# Patient Record
Sex: MALE | Race: WHITE | Employment: FULL TIME | URBAN - METROPOLITAN AREA
[De-identification: names, ages, dates, MRNs, and addresses within clinical notes are randomized per-mention and may not be internally consistent; named-entity substitution may affect disease eponyms.]

---

## 2019-01-11 ENCOUNTER — TELEPHONE (OUTPATIENT)
Dept: PALLIATIVE MEDICINE | Facility: CLINIC | Age: 34
End: 2019-01-11

## 2019-01-11 ENCOUNTER — OFFICE VISIT (OUTPATIENT)
Dept: INTERNAL MEDICINE | Facility: CLINIC | Age: 34
End: 2019-01-11
Payer: COMMERCIAL

## 2019-01-11 VITALS
HEIGHT: 71 IN | WEIGHT: 234.8 LBS | OXYGEN SATURATION: 98 % | BODY MASS INDEX: 32.87 KG/M2 | SYSTOLIC BLOOD PRESSURE: 112 MMHG | HEART RATE: 98 BPM | TEMPERATURE: 98.1 F | DIASTOLIC BLOOD PRESSURE: 68 MMHG

## 2019-01-11 DIAGNOSIS — G89.29 OTHER CHRONIC PAIN: ICD-10-CM

## 2019-01-11 DIAGNOSIS — Z13.1 SCREENING FOR DIABETES MELLITUS: ICD-10-CM

## 2019-01-11 DIAGNOSIS — Z11.4 SCREENING FOR HIV (HUMAN IMMUNODEFICIENCY VIRUS): ICD-10-CM

## 2019-01-11 DIAGNOSIS — Z00.00 ROUTINE HISTORY AND PHYSICAL EXAMINATION OF ADULT: Primary | ICD-10-CM

## 2019-01-11 DIAGNOSIS — Z23 NEED FOR PROPHYLACTIC VACCINATION AND INOCULATION AGAINST INFLUENZA: ICD-10-CM

## 2019-01-11 DIAGNOSIS — Z13.220 LIPID SCREENING: ICD-10-CM

## 2019-01-11 PROCEDURE — 99385 PREV VISIT NEW AGE 18-39: CPT | Mod: 25 | Performed by: PHYSICIAN ASSISTANT

## 2019-01-11 PROCEDURE — 90471 IMMUNIZATION ADMIN: CPT | Performed by: PHYSICIAN ASSISTANT

## 2019-01-11 PROCEDURE — 99213 OFFICE O/P EST LOW 20 MIN: CPT | Mod: 25 | Performed by: PHYSICIAN ASSISTANT

## 2019-01-11 PROCEDURE — 90686 IIV4 VACC NO PRSV 0.5 ML IM: CPT | Performed by: PHYSICIAN ASSISTANT

## 2019-01-11 RX ORDER — SULINDAC 150 MG/1
150 TABLET ORAL 2 TIMES DAILY
Qty: 60 TABLET | Refills: 3 | Status: SHIPPED | OUTPATIENT
Start: 2019-01-11 | End: 2020-01-11

## 2019-01-11 RX ORDER — OXYCODONE HYDROCHLORIDE 5 MG/1
5 TABLET ORAL EVERY 6 HOURS PRN
Qty: 5 TABLET | Refills: 0 | Status: SHIPPED | OUTPATIENT
Start: 2019-01-11 | End: 2019-01-14

## 2019-01-11 SDOH — HEALTH STABILITY: MENTAL HEALTH: HOW OFTEN DO YOU HAVE A DRINK CONTAINING ALCOHOL?: 2-4 TIMES A MONTH

## 2019-01-11 SDOH — HEALTH STABILITY: MENTAL HEALTH: HOW MANY STANDARD DRINKS CONTAINING ALCOHOL DO YOU HAVE ON A TYPICAL DAY?: 1 OR 2

## 2019-01-11 ASSESSMENT — MIFFLIN-ST. JEOR: SCORE: 2037.73

## 2019-01-11 NOTE — PROGRESS NOTES
SUBJECTIVE:   CC: Cuong Reyes is an 33 year old male who presents for preventative health visit.     Physical   Annual:     Getting at least 3 servings of Calcium per day:  Yes    Bi-annual eye exam:  Yes    Dental care twice a year:  Yes    Sleep apnea or symptoms of sleep apnea:  None    Diet:  Regular (no restrictions)    Frequency of exercise:  1 day/week    Duration of exercise:  15-30 minutes    Taking medications regularly:  Not Applicable    Additional concerns today:  No    PHQ-2 Total Score: 0      Chronic hand and shoulder pain:  Pt is wondering about getting back on pain medication. Patient reports a history of damaged ulnar nerve during shoulder surgery then second surgery to re-correct it. Since his surgeries he has had chronic pain. 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 He took sulindac daily. Pt reports he has not needed pain medication in the past year. He is starting work as a  next week and is worried he will need pain medication.     Today's PHQ-2 Score:   PHQ-2 (  [employer]) 2019   Q1: Little interest or pleasure in doing things 0   Q2: Feeling down, depressed or hopeless 0   PHQ-2 Score 0       Abuse: Current or Past(Physical, Sexual or Emotional)- No  Do you feel safe in your environment? Yes    Social History     Tobacco Use     Smoking status: Former Smoker     Last attempt to quit: 2014     Years since quittin.9     Smokeless tobacco: Never Used   Substance Use Topics     Alcohol use: Yes     Frequency: 2-4 times a month     Drinks per session: 1 or 2     Comment: every other every 2 week     No flowsheet data found.    Last PSA: No results found for: PSA    Reviewed orders with patient. Reviewed health maintenance and updated orders accordingly - Yes    Reviewed and updated as needed this visit by clinical staff  Tobacco  Allergies  Meds  Surg Hx         Reviewed and updated as needed this visit by  "Provider  Surg Hx          Review of Systems  CONSTITUTIONAL: NEGATIVE for fever, chills, change in weight  INTEGUMENTARY/SKIN: NEGATIVE for worrisome rashes, moles or lesions  EYES: NEGATIVE for vision changes or irritation  ENT: NEGATIVE for ear, mouth and throat problems  RESP: NEGATIVE for significant cough or SOB  CV: NEGATIVE for chest pain, palpitations or peripheral edema  GI: NEGATIVE for nausea, abdominal pain, heartburn, or change in bowel habits   male: negative for dysuria, hematuria, decreased urinary stream, erectile dysfunction, urethral discharge  MUSCULOSKELETAL: NEGATIVE for significant arthralgias or myalgia  Positive: pain in upper part of back   NEURO: NEGATIVE for weakness, dizziness or paresthesias  PSYCHIATRIC: NEGATIVE for changes in mood or affect    OBJECTIVE:   /68   Pulse 98   Temp 98.1  F (36.7  C) (Oral)   Ht 1.812 m (5' 11.35\")   Wt 106.5 kg (234 lb 12.8 oz)   SpO2 98%   BMI 32.43 kg/m      Physical Exam  GENERAL: healthy, alert and no distress  EYES: Eyes grossly normal to inspection, PERRL and conjunctivae and sclerae normal  HENT: ear canals and TM's normal, nose and mouth without ulcers or lesions  NECK: no adenopathy, no asymmetry, masses, or scars and thyroid normal to palpation  RESP: lungs clear to auscultation - no rales, rhonchi or wheezes  CV: regular rate and rhythm, normal S1 S2, no S3 or S4, no murmur, click or rub, no peripheral edema and peripheral pulses strong  ABDOMEN: soft, nontender, no hepatosplenomegaly, no masses and bowel sounds normal  MS: no gross musculoskeletal defects noted, no edema (noted shoulder and hand tenderness)   SKIN: no suspicious lesions or rashes  NEURO: Normal strength and tone, mentation intact and speech normal  PSYCH: mentation appears normal, affect normal/bright      ASSESSMENT/PLAN:     1. Routine history and physical examination of adult  - reviewed PMH and health maintenance     2. Screening for HIV (human " immunodeficiency virus)  - pt declined     3. Need for prophylactic vaccination and inoculation against influenza  - FLU VACCINE, SPLIT VIRUS, IM (QUADRIVALENT) [05770]- >3 YRS  - Vaccine Administration, Initial [81639]    4. Screening for diabetes mellitus  - Glucose; Future    5. Lipid screening  - Lipid panel reflex to direct LDL Fasting; Future    6. Other chronic pain  - referral for pain clinic for further review, short term supply in the meantime  -  reviewed with no red flags   - PAIN MANAGEMENT REFERRAL  - oxyCODONE (ROXICODONE) 5 MG tablet; Take 1 tablet (5 mg) by mouth every 6 hours as needed for pain  Dispense: 5 tablet; Refill: 0  - sulindac (CLINORIL) 150 MG tablet; Take 1 tablet (150 mg) by mouth 2 times daily  Dispense: 60 tablet; Refill: 3  - CBC with platelets; Future  - Comprehensive metabolic panel; Future    Lety Katz PA-C  [facility]    Injectable Influenza Immunization Documentation    1.  Is the person to be vaccinated sick today?   No    2. Does the person to be vaccinated have an allergy to a component   of the vaccine?   No  Egg Allergy Algorithm Link    3. Has the person to be vaccinated ever had a serious reaction   to influenza vaccine in the past?   No    4. Has the person to be vaccinated ever had Guillain-Barré syndrome?   No    Form completed by Milvia Hoffman

## 2019-01-11 NOTE — TELEPHONE ENCOUNTER
Pain Management Center Referral      1. Confirmed address with patient? Yes  2. Confirmed phone number with patient? Yes  3. Confirmed referring provider? Yes  4. Is the PCP the same as the referring provider? Yes  5. Has the patient been to any previous pain clinics? No  (If yes, send [facility] with welcome letter)  6. Which insurance are we to bill for this appointment? [employer]    7. Informed pt of cancellation (48 hour) policy? Yes    REGARDING OPIOID MEDICATIONS: We will always address appropriateness of opioid pain medications, but we generally will not automatically take on a prescribing role. When we do take on prescribing of opioids for chronic pain, it is in collaboration with the referring physician for an intermediate period of time (months), with an expectation that the primary physician or provider will assume the prescribing role if medications are effective at stable doses with demonstrated compliance. Therefore, please do not assume that your prescribing responsibilities end on the day of pain clinic consultation.  8. Informed pt of prescribing policy? Yes    9.Please be aware that once you are established with a pain provider and location, you will need to continue have all future visits with that provider and location. It is best to determine what location is the most convenient for you and schedule with that one.    ** PATIENT INFORMED OF THIS POLICY Yes      9. Referring Provider: No Ref-Primary, Physician

## 2019-01-15 DIAGNOSIS — G89.29 OTHER CHRONIC PAIN: ICD-10-CM

## 2019-01-15 DIAGNOSIS — Z13.1 SCREENING FOR DIABETES MELLITUS: ICD-10-CM

## 2019-01-15 DIAGNOSIS — Z13.220 LIPID SCREENING: ICD-10-CM

## 2019-01-15 LAB
ALBUMIN SERPL-MCNC: 4.3 G/DL (ref 3.4–5)
ALP SERPL-CCNC: 54 U/L (ref 40–150)
ALT SERPL W P-5'-P-CCNC: 33 U/L (ref 0–70)
ANION GAP SERPL CALCULATED.3IONS-SCNC: 4 MMOL/L (ref 3–14)
AST SERPL W P-5'-P-CCNC: 15 U/L (ref 0–45)
BILIRUB SERPL-MCNC: 0.6 MG/DL (ref 0.2–1.3)
BUN SERPL-MCNC: 16 MG/DL (ref 7–30)
CALCIUM SERPL-MCNC: 9.1 MG/DL (ref 8.5–10.1)
CHLORIDE SERPL-SCNC: 107 MMOL/L (ref 94–109)
CHOLEST SERPL-MCNC: 130 MG/DL
CO2 SERPL-SCNC: 28 MMOL/L (ref 20–32)
CREAT SERPL-MCNC: 0.83 MG/DL (ref 0.66–1.25)
ERYTHROCYTE [DISTWIDTH] IN BLOOD BY AUTOMATED COUNT: 12.7 % (ref 10–15)
GFR SERPL CREATININE-BSD FRML MDRD: >90 ML/MIN/{1.73_M2}
GLUCOSE SERPL-MCNC: 90 MG/DL (ref 70–99)
HCT VFR BLD AUTO: 42.4 % (ref 40–53)
HDLC SERPL-MCNC: 40 MG/DL
HGB BLD-MCNC: 14.3 G/DL (ref 13.3–17.7)
LDLC SERPL CALC-MCNC: 72 MG/DL
MCH RBC QN AUTO: 29.1 PG (ref 26.5–33)
MCHC RBC AUTO-ENTMCNC: 33.7 G/DL (ref 31.5–36.5)
MCV RBC AUTO: 86 FL (ref 78–100)
NONHDLC SERPL-MCNC: 90 MG/DL
PLATELET # BLD AUTO: 163 10E9/L (ref 150–450)
POTASSIUM SERPL-SCNC: 4.3 MMOL/L (ref 3.4–5.3)
PROT SERPL-MCNC: 7.1 G/DL (ref 6.8–8.8)
RBC # BLD AUTO: 4.92 10E12/L (ref 4.4–5.9)
SODIUM SERPL-SCNC: 139 MMOL/L (ref 133–144)
TRIGL SERPL-MCNC: 92 MG/DL
WBC # BLD AUTO: 4.5 10E9/L (ref 4–11)

## 2019-01-15 PROCEDURE — 80053 COMPREHEN METABOLIC PANEL: CPT | Performed by: PHYSICIAN ASSISTANT

## 2019-01-15 PROCEDURE — 80061 LIPID PANEL: CPT | Performed by: PHYSICIAN ASSISTANT

## 2019-01-15 PROCEDURE — 85027 COMPLETE CBC AUTOMATED: CPT | Performed by: PHYSICIAN ASSISTANT

## 2019-01-15 PROCEDURE — 36415 COLL VENOUS BLD VENIPUNCTURE: CPT | Performed by: PHYSICIAN ASSISTANT

## 2019-01-23 ENCOUNTER — OFFICE VISIT (OUTPATIENT)
Dept: PALLIATIVE MEDICINE | Facility: CLINIC | Age: 34
End: 2019-01-23
Payer: COMMERCIAL

## 2019-01-23 VITALS
SYSTOLIC BLOOD PRESSURE: 126 MMHG | DIASTOLIC BLOOD PRESSURE: 81 MMHG | OXYGEN SATURATION: 97 % | HEART RATE: 83 BPM | BODY MASS INDEX: 31.9 KG/M2 | WEIGHT: 231 LBS

## 2019-01-23 DIAGNOSIS — M25.512 CHRONIC LEFT SHOULDER PAIN: Primary | ICD-10-CM

## 2019-01-23 DIAGNOSIS — G89.29 CHRONIC LEFT SHOULDER PAIN: Primary | ICD-10-CM

## 2019-01-23 DIAGNOSIS — M79.2 NEUROPATHIC PAIN: ICD-10-CM

## 2019-01-23 PROCEDURE — 99244 OFF/OP CNSLTJ NEW/EST MOD 40: CPT | Performed by: PHYSICAL MEDICINE & REHABILITATION

## 2019-01-23 RX ORDER — GABAPENTIN 300 MG/1
300 CAPSULE ORAL 3 TIMES DAILY
Qty: 90 CAPSULE | Refills: 0 | Status: SHIPPED | OUTPATIENT
Start: 2019-01-23 | End: 2019-02-28

## 2019-01-23 ASSESSMENT — PAIN SCALES - GENERAL: PAINLEVEL: SEVERE PAIN (6)

## 2019-01-23 NOTE — PATIENT INSTRUCTIONS
Thank you for coming to [facility] for your care. It is my goal to partner with you to help you reach your optimal state of health.     You were seen today for your left shoulder and hand pain. As discussed during your visit these are the recommendations:    1. Medications:  - Try using diclofenac gel on your left shoulder.  - You can also use over the counter lidocaine patches to place on your left shoulder  - Start gabapentin as follows:  1 tab= 300mg    AM   PM   Bedtime  0   0   300mg (1 tab).  After 5 days, increase as tolerated to  the next line  300mg (1 tab)  0   300mg (1 tab).  After 5 days, increase as tolerated to the next line  300mg (1 tab)  300mg (1 tab)  300mg (1 tab).  After 5 days, increase as tolerated to the next line  300mg (1 tabs)  300mg (1 tabs)  600mg (2 tabs).  After 5 days, increase as tolerated to the next line  600mg (2 tabs)  300mg (1 tab)  600mg (2 tabs).  After 5 days, increase as tolerated to the next line  600mg (2 tabs)  600 mg (2 tabs)  600 mg (2 tabs).  Call with any problems or when you are at this dose.      Caution for sedation.    Do not drive until you know how the medication affects you. Avoid activities that require mental alertness or coordination until you realize the effects of the medication as it can cause dizziness, sleepiness, fatigue and incoordination.    You can go slower if you need to or increasing only one dose at a time.    Do not stop abruptly once at higher doses.  This medication must be tapered off. Speak with your pain provider prior to discontinuing    Although very uncommon, If you experience changes in your mood, personality, worsening depression, suicidal or homicidal thoughts seek medical care immediately.    Avoid use of alcohol with this medication as it can cause worsening sedation.    2. Therapies: None  3. Procedures: None  4. Imaging/Diagnostic Studies: None  5. Other: Acupuncture is an other option.   6. Follow up in 6-8  weeks in clinic  ----------------------------------------------------------------  Clinic Number:  [phone]   Call this number with any questions about your care and for scheduling assistance. Calls are returned Monday through Friday between 8 AM and 4:30 PM. We usually get back to you within 2 business days depending on the issue/request.       Medication refills:    For non-narcotic medications, call your pharmacy directly to request a refill. The pharmacy will contact the Pain Management Center for authorization. Please allow 3-4 days for these refills to be processed.     For narcotic refills, call the clinic number or send a docplanner message. Please contact us 7-10 days before your refill is due. The message MUST include the name of the specific medication(s) requested and how you would like to receive the prescription(s). The options are as follows:    Pain Clinic staff can mail the prescription to your pharmacy. Please tell us the name of the pharmacy.    You may pick the prescription up at the Pain Clinic (tell us the location) or during a clinic visit with your pain provider    Pain Clinic staff can deliver the prescription to the [place] pharmacy in the clinic building. Please tell us the location.      We believe regular attendance is key to your success in our program.    Any time you are unable to keep your appointment we ask that you call us at least 24 hours in advance to let us know. This will allow us to offer the appointment time to another patient.

## 2019-01-23 NOTE — PROGRESS NOTES
"                      [facility] Consultation    Date of visit: 1/23/2019    Reason for consultation:    Cuong Reyes is a 33 year old male who is seen in consultation today at the request of Lety Katz PA-C.    Consultation and Evaluation for: \"is chronic pain medication an indication\"      Please see the [facility] [place] health questionnaire which the patient completed and reviewed with me in detail.    Review of [place] Prescription Monitoring Program (): No concern for abuse or misuse of controlled medications based on this report.     Pain medications are being prescribed by PCP.     Cuong Reyes has not been seen at a pain clinic in the past.      Chief Complaint:    Chief Complaint   Patient presents with     Pain     Pain history:  Cuong Reyes is a 33 year old male who presents for initial evaluation of chief pain complaint of left shoulder and left hand pain. He first started having problems with pain in 2009. 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 He tried conservative management to help with the shoulder but that was not successful. He ended up having surgery for the left shoulder in 2011. 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 Some time passed to see if the nerve could heal on its own. After this was unsuccessful he ended up having a tendon transfer surgery in 2012.     Since that time he has had constant pain in the left shoulder and in the ulnar distribution of the left hand. He describes the pain as aching with bouts of sharp pain and burning pain. The more he uses the left arm the worse the pain becomes. He is right handed so he tries to not use the left arm if he doesn't have to.  Severity/Intensity: 3/10 at best, 7/10 at worst, 5/10 on average.Pain is aggravated by overhead movements, laying " on the left side. It is improved with laying on the right side and resting his arm on a surface.     He has had many rounds of PT without benefit. He has had steroid injections in the left shoulder which provided relief only with the local anesthetic. In the past he would use opioids sparingly about 1-2 times per week when pain became severe in order to keep doing his duties at work. Over the past year he has not used opioids because he has not been working. He recently started working as a  last week and is worried that his pain will increase at times due to work.     Pain Treatments:  (H--helped; HI--Helped initially; [facility]--Somewhat helpful; NH--No help; W--worse; SE--side effects; ?--Unsure if helpful)   1. Medications:       Current pain medications:   [id number] mg BID - takes edge off   Cyclobenzaprine 5-10 mg TID      Current calculated MME: 0    1. Previous Pain Relevant Medications:  NOTE: This medication information taken from patient's intake form, not medical records.    Opiates: hydrocodone, oxycodone - H, tramadol - NH   NSAIDS: ibuprofen - NH, naproxen - NH    Muscle Relaxants: None   Anti-migraine mediations: None   Anti-depressants: None   Sleep aids:None   Anxiolytics: None   Neuropathics: None    Topicals: None   Other medications not covered above: none     2. Physical Therapy: yes, multiple times - [facility]  3. Pain Psychology: No  4. Surgery: left shoulder surgery and tendon transfer  5. Injections: Steroid injections into left shoulder   6. Chiropractic: No  7. Acupuncture: No  8. TENS Unit: Yes and [facility]  9. Other: None    Diagnostic tests:  No imaging available.    EMG/Testing:  Has had EMGs done prior to tendon transfer.     Labs:   Creatinine 0.83  GFR >90    Past Medical History:  Past Medical History:   Diagnosis Date     Other specified viral exanthemata        Past Surgical History:  Past Surgical History:   Procedure Laterality Date     ORTHOPEDIC SURGERY      biceps tenodesis  surgery     ORTHOPEDIC SURGERY      tendon transfer for ulnar nerve palsy       Medications:  Current Outpatient Medications   Medication Sig Dispense Refill     cyclobenzaprine (FLEXERIL) 5 MG tablet Take 1-2 tablets (5-10 mg) by mouth 3 times daily as needed for muscle spasms (Patient not taking: Reported on 1/11/2019) 30 tablet 0     fluticasone (FLONASE) 50 MCG/ACT nasal spray Spray 1 spray into both nostrils daily (Patient not taking: Reported on 1/11/2019) 1 Package 11     oxyCODONE (ROXICODONE) 5 MG immediate release tablet Take 1-2 tablets (5-10 mg) by mouth every 4 hours as needed for moderate to severe pain 30 tablet 0     sulindac (CLINORIL) 150 MG tablet Take 1 tablet (150 mg) by mouth 2 times daily 60 tablet 3       Allergies:     Allergies   Allergen Reactions     No Known Drug Allergies        Social History:  Home situation: Lives in [place], [place]. Lives with parents, brother, wife and kid.  Occupation/Schooling: [employer] . Will work 4 times per week for 10 hour shifts.  Tobacco use: Former smoker. Uses nicotine daily though  Drug use: None  Alcohol use: once to twice per week  History of chemical dependency treatment: None  Mental health admissions: None    Family history:  Family History   Problem Relation Age of Onset     Diabetes Mother      Diabetes Father        Review of Systems:    POSTIVE IN BOLD  GENERAL: fever/chills, fatigue, general unwell feeling, weight gain/loss.  HEAD/EYES:  headache, dizziness, or vision changes.    EARS/NOSE/THROAT:  Nosebleeds, hearing loss, sinus infection, earache, tinnitus.  IMMUNE:  Allergies, cancer, immune deficiency, or infections.  SKIN:  Urticaria, rash, hives  HEME/Lymphatic:   anemia, easy bruising, easy bleeding.  RESPIRATORY:  cough, wheezing, or shortness of breath  CARDIOVASCULAR/Circulation:  Extremity edema, syncope, hypertension, tachycardia, or angina.  GASTROINTESTINAL:  abdominal pain, nausea/emesis, diarrhea, constipation,   hematochezia, or melena.  ENDOCRINE:  Diabetes, steroid use,  thyroid disease or osteoporosis.  MUSCULOSKELETAL: neck pain, back pain, arthralgia, arthritis, or gout.  GENITOURINARY:  frequency, urgency, dysuria, difficulty voiding, hematuria or incontinence.  NEUROLOGIC:  weakness, numbness, paresthesias, seizure, tremor, stroke or memory loss.  PSYCHIATRIC:  depression, anxiety, stress, suicidal thoughts or mood swings.     Physical Exam:  Vitals:    01/23/19 0859   BP: 126/81   Pulse: 83   SpO2: 97%   Weight: 104.8 kg (231 lb)     Exam:  Constitutional: Well developed, well nourished, appears stated age.  HEENT: Head atraumatic, normocephalic. Eyes without conjunctival injection or jaundice. Oropharynx clear. Neck supple. No obvious neck masses.  Respiratory: Breathing unlabored on room air.   Gastrointestinal:  Abdomen soft, non-tender, without guarding/rebound.  Skin: No rash, lesions, or petechiae of exposed skin.   Extremities:  No clubbing, cyanosis, or edema.  Psychiatric/mental status: Alert, without lethargy or stupor. Speech fluent. Appropriate affect. Mood normal. Able to follow commands without difficulty.     Musculoskeletal exam:  Gait/Station/Posture:  Normal stance, arm swing, and stride; no antalgia or Trendelenburg  Normal bulk and tone. Unremarkable spinal curvature.    Cervical spine:  Range of motion within normal limits     Shoulder exam:   No shoulder girdle wasting or scapular dyskinesis. No palmar muscle wasting. There is tenderness in left bicipital groove. Shoulder range of motion within normal limits.     Neurologic exam:  CN:  Cranial nerves 2-12 are grossly intact  Motor Strength:  5/5 symmetric UE and LE strength, except for     Finger abduction (C8)      R: 5/5  L: 4/5  Thumb flexion (C8)       R: 5/5  L: 4/5        Reflexes:     Biceps C5:     R:  2/4 L: 2/4   Brachioradialis  C6: R:  2/4 L: 2/4   Triceps C7:  R:  2/4 L: 2/4   Patella L4:  R:  2/4 L: 2/4   Achilles S1:  R:  2/4 L:  2/4    Sensory:  (upper and lower extremities):   Light touch: dysesthesias in ulnar nerve distribution in left arm and hand   Allodynia: absent    Hyperalgesia: absent     DIRE Score for ongoing opioid management is calculated as follows:   Diagnosis = 1 pt (benign chronic illness; minimal objective findings; no definite diagnosis)   Intractability = 2 pts (most treatments tried; patient not fully engaged/barriers)   Risk    Psych = 3 pts (no significant personality dysfunction/mental illness; good communication with clinic)    Chem Hlth = 3 pts (no history of chemical dependency; not drug-focused)   Reliability = 3 pts (highly reliable with meds, appointments, treatments)   Social = 3 pts (supportive family/close relationships; involved in work/school; no isolation)   (Psych + Chem hlth + Reliability + Social) = 15     Efficacy = 2 pts (moderate benefit/function; low med dose; too early/not tried meds)         DIRE Score = 17        7-13: likely NOT suitable candidate for long-term opioid analgesia       14-21: may be a suitable candidate for long-term opioid analgesia     Assessment:  Cuong Reyes is a 33 year old male with a past medical history significant for left shoulder dislocation s/p left shoulder surgery with resulting ulnar nerve injury requiring repair who presents with complaints of chronic left shoulder and arm/hand pain.     1. Left shoulder pain: Ongoing s/p surgery in 2011.  2. Neuropathic pain: in left ulnar distribution. He has not tried any medications to help with neuropathic pain in the past.     Plan:  The following recommendations were given to the patient. Diagnosis, treatment options, risks, benefits, and alternatives were discussed, and all questions were answered. The patient expressed understanding of the plan for management.     I am recommending a multidisciplinary treatment plan to help this patient better manage his pain.  This includes:     1. Therapy: Not at this time.  He has had multiple sessions of PT in the past. He has good shoulder mobility.   2. Clinical Health Pain Psychologist: Not at this time. Can consider if he is not coping well with pain. At this time he seems to be coping ok. Therapy can help reduce physical and psychosocial triggers or reinforcers of pain by adapting thoughts, feelings and behaviors to reduce symptoms and increase quality of life.  Evidence indicates that the practice of relaxation, meditation, and mindfulness techniques can significantly affect pain levels and overall well being.  3. Diagnostic Studies: No imaging needed  4. Medication Management:   1. Start gabapentin. Titration instructions given to patient in AVS. Titrate up to 600 mg TID if well tolerated.  2. Other medication options to consider include a TCA and/or SNRI.   3. Start diclofenac 1% gel to apply to painful areas on the left shoulder  4. Discussed that he can also use OTC lidocaine patches to place over those areas too  5. Discussed that for the time being until we can find other medication options that can help better manage his pain that it would be ok for him to very sparingly use oxycodone if pain is severe when he is working. Would limit to using 1-2 times per week.   5. Further procedures recommended: None  6. Other: Acupuncture is an other option to try in the future.   7. Follow up: 6-8 weeks in clinic      Review of Electronic Chart: Today I have also reviewed available medical information in the patient's medical record at [facility] ([facility]), including relevant provider notes, laboratory work, and imaging.     I spent 60 minutes of time face to face with the patient.  Greater than 50% of this time was spent in patient counseling and/or coordination of care regarding principles of multidisciplinary care, medication management, and treatment options as discussed above.     Samina Conley MD  [facility]

## 2019-02-28 DIAGNOSIS — M79.2 NEUROPATHIC PAIN: ICD-10-CM

## 2019-02-28 RX ORDER — GABAPENTIN 300 MG/1
600 CAPSULE ORAL 3 TIMES DAILY
Qty: 180 CAPSULE | Refills: 1 | Status: SHIPPED | OUTPATIENT
Start: 2019-02-28

## 2019-02-28 NOTE — TELEPHONE ENCOUNTER
Per pt he was told to call when he reached the  maximum dose of the gabapentin (NEURONTIN) 300 MG capsule. Please call pt to advise.      Nicol LOGAN    [facility] [place]

## 2019-02-28 NOTE — TELEPHONE ENCOUNTER
Called patient. He is tolerating Gabapentin well. Is taking 600mg TID. Will need refill with quantity adjustment.       Per OV 01/23/19:  1. Start gabapentin. Titration instructions given to patient in AVS. Titrate up to 600 mg TID if well tolerated.    Prepped refill:  Gabapentin 300mg Capsule, #180, Refill:1      Leatha DONOHUEN, RN Care Coordinator  [facility]

## 2019-03-13 ENCOUNTER — OFFICE VISIT (OUTPATIENT)
Dept: PALLIATIVE MEDICINE | Facility: CLINIC | Age: 34
End: 2019-03-13
Payer: COMMERCIAL

## 2019-03-13 VITALS — DIASTOLIC BLOOD PRESSURE: 83 MMHG | OXYGEN SATURATION: 97 % | SYSTOLIC BLOOD PRESSURE: 128 MMHG | HEART RATE: 93 BPM

## 2019-03-13 DIAGNOSIS — M25.512 CHRONIC LEFT SHOULDER PAIN: Primary | ICD-10-CM

## 2019-03-13 DIAGNOSIS — G89.29 CHRONIC LEFT SHOULDER PAIN: Primary | ICD-10-CM

## 2019-03-13 PROCEDURE — 99214 OFFICE O/P EST MOD 30 MIN: CPT | Performed by: PHYSICAL MEDICINE & REHABILITATION

## 2019-03-13 ASSESSMENT — PAIN SCALES - GENERAL: PAINLEVEL: MODERATE PAIN (4)

## 2019-03-13 NOTE — PATIENT INSTRUCTIONS
1. Since the gabapentin has not been helpful we will taper off of it.   Start gabapentin as follows:  1 tab= 300mg    AM   PM   Bedtime  600mg (2 tabs)  300mg (1 tab)  600mg (2 tabs).  After 5 days, decrease to the next line  300mg (1 tabs)  300mg (1 tabs)  600mg (2 tabs).  After 5 days, decrease to the next line  300mg (1 tab)  300mg (1 tab)  300mg (1 tab).  After 5 days, decrease to the next line  300mg (1 tab)  0   300mg (1 tab).  After 5 days, decrease to the next line  0   0   300mg (1 tab).  After 5 days STOP.    2. I placed an order to start acupuncture. You can schedule appointments on your way out today.  3. We completed the medical cannabis certification today. It can take up to 30 days to receive an e-mail from the [facility]  4. Consider following up with Orthopedics for follow up of left shoulder since you feel like it has been popping more.   4. Follow up in 12 weeks    ----------------------------------------------------------------  Clinic Number:  [phone]   Call this number with any questions about your care and for scheduling assistance. Calls are returned Monday through Friday between 8 AM and 4:30 PM. We usually get back to you within 2 business days depending on the issue/request.       Medication refills:    For non-narcotic medications, call your pharmacy directly to request a refill. The pharmacy will contact the [facility] for authorization. Please allow 3-4 days for these refills to be processed.     For narcotic refills, call the clinic number or send a [facility] message. Please contact us 7-10 days before your refill is due. The message MUST include the name of the specific medication(s) requested and how you would like to receive the prescription(s). The options are as follows:    Pain Clinic staff can mail the prescription to your pharmacy. Please tell us the name of the pharmacy.    You may pick the prescription up at the Pain Clinic (tell us the  location) or during a clinic visit with your pain provider    Pain Clinic staff can deliver the prescription to the [facility] pharmacy in the clinic building. Please tell us the location.      We believe regular attendance is key to your success in our program.    Any time you are unable to keep your appointment we ask that you call us at least 24 hours in advance to let us know. This will allow us to offer the appointment time to another patient.

## 2019-03-13 NOTE — PROGRESS NOTES
[facility]    Date of visit: 3/13/2019    Chief complaint:   Chief Complaint   Patient presents with     Pain     Interval history:  Cuong Reyes is a 33 year old male last seen by me on 1/23/2019.      Recommendations/plan at the last visit included:     1. Therapy: Not at this time. He has had multiple sessions of PT in the past. He has good shoulder mobility.   2. Clinical Health Pain Psychologist: Not at this time. Can consider if he is not coping well with pain. At this time he seems to be coping ok. Therapy can help reduce physical and psychosocial triggers or reinforcers of pain by adapting thoughts, feelings and behaviors to reduce symptoms and increase quality of life.  Evidence indicates that the practice of relaxation, meditation, and mindfulness techniques can significantly affect pain levels and overall well being.  3. Diagnostic Studies: No imaging needed  4. Medication Management:   1. Start gabapentin. Titration instructions given to patient in AVS. Titrate up to 600 mg TID if well tolerated.  2. Other medication options to consider include a TCA and/or SNRI.   3. Start diclofenac 1% gel to apply to painful areas on the left shoulder  4. Discussed that he can also use OTC lidocaine patches to place over those areas too  5. Discussed that for the time being until we can find other medication options that can help better manage his pain that it would be ok for him to very sparingly use oxycodone if pain is severe when he is working. Would limit to using 1-2 times per week.   5. Further procedures recommended: None  6. Other: Acupuncture is an other option to try in the future.   7. Follow up: 6-8 weeks in clinic    Since his last visit, Cuong Reyes reports:  -his pain is no change to worse than it was at last visit.   -Main pain continues to be in the left shoulder. It is a deep aching pain. He also has been having more constant paresthesias in the left hand  "ulnar aspect. However, the paresthesias are more of a nuisance than pain.    -Since his last appointment has started his new job which could be aggravating the pain a bit more. He has only taken 1 tablet of oxycodone 5 mg since his last visit when pain was severe.   - He reports noticing that he feels more \"popping\" sensations in the left shoulder than before.     Pain Information:   Pain quality: Aching    Pain timing: Constant     Pain rating: intensity ranges from 3/10 to 8/10, and averages 4/10 on a 0-10 scale. Currently, 3/10.   Aggravating factors include: overhead movements, laying on the left side   Relieving factors include: laying on the right side and resting the arm    Current pain treatments:     Current pain medications:              [id number] mg BID - takes edge off   Voltaren 1% gel - [facility]   Gabapentin 600 mg TID - [facility]              Oxycodone 5 mg q 6 hours prn - [facility] (has only used once)   Lidocaine 4% patch - [facility] (for superficial pain, not for the deep pain)     Current MME: ? He is using very sparingly. Used 1 tablet over the past month    Review of [place] Prescription Monitoring Program (): No concern for abuse or misuse of controlled medications based on this report.     Annual Controlled Substance Agreement/UDS due date: NA    Past pain treatments:  1. Previous Pain Relevant Medications:  NOTE: This medication information taken from patient's intake form, not medical records.               Opiates: hydrocodone, oxycodone - [facility], tramadol - [facility]              NSAIDS: ibuprofen - [facility], naproxen - [facility]              Muscle Relaxants: Flexeril - ?              Anti-migraine mediations: None              Anti-depressants: None              Sleep aids:None              Anxiolytics: None              Neuropathics: None                  Topicals: None              Other medications not covered above: none      2. Physical Therapy: yes, multiple times - [facility]   3. Pain Psychology: No  4. Surgery: left shoulder " surgery and tendon transfer   5. Injections: Steroid injections into left shoulder - only helpful while lidocaine was working  6. Chiropractic: No  7. Acupuncture: No    8. TENS Unit: Yes and [facility]  9. Other: None    Medications:  Current Outpatient Medications   Medication Sig Dispense Refill     diclofenac (VOLTAREN) 1 % topical gel Place onto the skin 4 times daily 100 g 1     fluticasone (FLONASE) 50 MCG/ACT nasal spray Spray 1 spray into both nostrils daily (Patient not taking: Reported on 1/11/2019) 1 Package 11     gabapentin (NEURONTIN) 300 MG capsule Take 2 capsules (600 mg) by mouth 3 times daily 180 capsule 1     oxyCODONE (ROXICODONE) 5 MG immediate release tablet Take 1-2 tablets (5-10 mg) by mouth every 4 hours as needed for moderate to severe pain 30 tablet 0     sulindac (CLINORIL) 150 MG tablet Take 1 tablet (150 mg) by mouth 2 times daily 60 tablet 3       Medical History: any changes in medical history since they were last seen? No    Review of Systems:  The 14 system ROS was reviewed from the intake questionnaire, and is positive for: weight loss.  Any bowel or bladder problems: none  Mood: ok    Physical Exam:  Blood pressure 128/83, pulse 93, SpO2 97 %.  General: A&O x3, in NAD. Pleasant and cooperative.   Gait: Normal  Neuro: 5/5 in BUE with exception of 4/5 left finger abduction and flexion.     Imaging:  No imaging available    Assessment:   Cuong Reyes is a 33 year old male with a past medical history significant for left shoulder dislocation s/p left shoulder surgery with resulting ulnar nerve injury requiring repair who presents with complaints of chronic left shoulder and arm/hand pain.      1. Left shoulder pain: Ongoing s/p surgery in 2011.  2. Neuropathic pain: in left ulnar distribution.     Plan:     1. Therapy: Not at this time. He has had multiple sessions of PT in the past. He has good shoulder mobility.   2. Clinical Health Pain Psychologist: Not at this time. Can  consider if he is not coping well with pain. At this time he seems to be coping ok.   3. Diagnostic Studies: No imaging needed  4. Medication Management:   1. Gabapentin has not been helpful so will discontinue. Taper instructions written for patient in AVS.   2. Continue lidocaine 4% patches.   3. Ok to continue using oxycodone sparingly when pain is severe.   4. He has ongoing pain despite using NSAIDs, topical medication, tylenol, trial of neuropathic medication, PT without benefit. He does quality as having intractable pain. Certification completed for medical cannabis.   5. Further procedures recommended: None  6. Other:   1. Order placed to start acupuncture  2. Discussed that he should follow up with Orthopedics if he has been noticing more popping sensations in the shoulder.  7. Follow up: 12 weeks in clinic    I spent 30 minutes of time face to face with the patient.  Greater than 50% of this time was spent in patient counseling and/or coordination of care.    Samina Conley MD  [facility]

## 2019-04-02 ENCOUNTER — THERAPY VISIT (OUTPATIENT)
Dept: CHIROPRACTIC MEDICINE | Facility: CLINIC | Age: 34
End: 2019-04-02
Payer: COMMERCIAL

## 2019-04-02 DIAGNOSIS — M54.12 BRACHIAL NEURITIS OR RADICULITIS: ICD-10-CM

## 2019-04-02 DIAGNOSIS — R20.2 PARESTHESIAS: ICD-10-CM

## 2019-04-02 DIAGNOSIS — M25.512 CHRONIC LEFT SHOULDER PAIN: ICD-10-CM

## 2019-04-02 DIAGNOSIS — M99.01 CERVICAL SEGMENT DYSFUNCTION: Primary | ICD-10-CM

## 2019-04-02 DIAGNOSIS — M99.02 THORACIC SEGMENT DYSFUNCTION: ICD-10-CM

## 2019-04-02 DIAGNOSIS — G89.29 CHRONIC LEFT SHOULDER PAIN: ICD-10-CM

## 2019-04-02 PROCEDURE — 98940 CHIROPRACT MANJ 1-2 REGIONS: CPT | Mod: AT | Performed by: CHIROPRACTOR

## 2019-04-02 PROCEDURE — 99203 OFFICE O/P NEW LOW 30 MIN: CPT | Mod: 25 | Performed by: CHIROPRACTOR

## 2019-04-07 PROBLEM — G89.29 CHRONIC LEFT SHOULDER PAIN: Status: ACTIVE | Noted: 2019-04-07

## 2019-04-07 PROBLEM — M25.512 CHRONIC LEFT SHOULDER PAIN: Status: ACTIVE | Noted: 2019-04-07

## 2019-04-07 PROBLEM — M99.02 THORACIC SEGMENT DYSFUNCTION: Status: ACTIVE | Noted: 2019-04-07

## 2019-04-07 PROBLEM — R20.2 PARESTHESIAS: Status: ACTIVE | Noted: 2019-04-07

## 2019-04-07 PROBLEM — M54.12 BRACHIAL NEURITIS OR RADICULITIS: Status: ACTIVE | Noted: 2019-04-07

## 2019-04-07 PROBLEM — M99.01 CERVICAL SEGMENT DYSFUNCTION: Status: ACTIVE | Noted: 2019-04-07

## 2019-04-07 NOTE — PROGRESS NOTES
Chiropractic Clinic Visit    PCP: No Ref-Primary, Physician    Cuong Reyes is a 33 year old male who is seen  in consultation at the request of  Samina Conley M.D. presenting with chronic R shoulder pain with radiation in the R hand. Patient reports that the onset was 2010 When asked, patient denies:, falling, slipping, bending and reaching or sleeping awkwardly. 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 In 2011 he had L shoulder surgery and hand surgery for a damaged ulnar nerve. Ever since the pt notes L shoulder pain with radiation in the L hand. He grades the px a 2-9/10 on VAS depending on the activity. Reaching behind the back will increase the pain. The pt denies weakness in the L arm. He notes some L neck sx intermittently.  Prior to onset, the patient was able to sleep uninterrupted. . Patient notes that due to symptoms, they can only lay on the R side when sleeping. Cuong Reyes notes   sleeping rated at a 9/10 difficulty  and prior to this incident it was 1/10.    Injury: dislocation injury in 2010    Location of Pain: left shoulder at the following level(s) C7 , T1 , T6  and T9   Duration of Pain: 9 years    Rating of Pain at worst: 9/10  Rating of Pain Currently: 2/10  Symptoms are better with: Nothing  Symptoms are worse with: sleeping, reaching   Additional Features: L hand pain       Health History  as reported by the patient:    How does the patient rate their own health:   Good    Current or past medical history:   Smoking    Medical allergies  None    Past Traumas/Surgeries  Orthopedic: L shoulder and L hand surgery    Family History  Family History   Problem Relation Age of Onset     Diabetes Mother      Diabetes Father        Medications:  Anti-inflammatory    Occupation:        Primary job tasks:   Lifting/carrying, Prolonged standing and Repetitive tasks    Barriers as home/work:   none    Additional health Issues:     None        Review of Systems  Musculoskeletal: as above  Remainder of review of systems is negative including constitutional, CV, pulmonary, GI, Skin and Neurologic except as noted in HPI or medical history.    Past Medical History:   Diagnosis Date     Other specified viral exanthemata      Past Surgical History:   Procedure Laterality Date     ORTHOPEDIC SURGERY      biceps tenodesis surgery     ORTHOPEDIC SURGERY      tendon transfer for ulnar nerve palsy       Objective  There were no vitals taken for this visit.    GENERAL APPEARANCE: healthy, alert and no distress   GAIT: NORMAL  SKIN: no suspicious lesions or rashes  NEURO: Normal strength and tone, mentation intact and speech normal  PSYCH:  mentation appears normal and affect normal/bright    Cuong was asked to complete the Neck Disability Index, today in the office. NDI Disability score: pending%; pain severity scale: 9/10.  KATIA: 2  Saima: 1  Subscore: 0         Cervical Spine Exam    Range of Motion:         Full active and passive ROM forward flexion, extension,  decreased L  lateral rotation, lateral flexion.    Inspection:         No visible deformity        normal lordotic curvature maintained    Tender:        upper border of trapezius       L subscapularis    Non-Tender:        remainder of cervical spine area    Muscle strength:       C4 (shoulder shrug)  symmetric 5/5 Normal       C5 (shoulder abduction) symmetric 5/5 Normal       C6 (elbow flexion) symmetric 5/5 Normal       C7 (elbow extension) symmetric 5/5 Normal       C8 (finger abduction, thumb flexion) symmetric 5/5 Normal    Reflexes:        C5 (biceps) symmetric 2 bilaterally       C6 (supinator) symmetric 2 bilaterally       C7 (triceps) symmetric 2 bilaterally    Sensation:       grossly intact througout bilateral upper extremities    Special Tests:       positive (+) Spurling  Ashutosh's- positive, VBI- negative and Dominguez Benitez - negative    Lymphatics:        no edema noted in the upper  extremities       Segmental spinal dysfunction/restrictions found at:  C7 , T1  and T6       The following soft tissue hypotonicities were observed:Lev scapulae: ache and dull pain, referred pain: no    Trigger points were found in:none     Muscle spasm found in:Levator scapulae and L subscapularis :        L shoulder examination:   appearance normal, range of motion: abduction mildly reduced, internal rotation moderately reduced, external rotation mildly reduced, tenderness at L subscapularis, L biceps         Radiology:  None     Assessment:    1. Cervical segment dysfunction    2. Chronic left shoulder pain    3. Thoracic segment dysfunction    4. Brachial neuritis or radiculitis    5. Paresthesias        RX ordered/plan of care  Anticipated outcomes  Possible risks and side effects    After discussing the risk and benefits of care, patient consented to treatment    Patient's condition:  Patient had restrictions pre-manipulation    Treatment effectiveness:  Post manipulation there is better intersegmental movement and Patient claims to feel looser post manipulation    Plan:    Procedures:    Evaluation and Management:  97870 Moderate level exam 30 min    CMT:  01772 Chiropractic manipulative treatment 1-2 regions performed   Cervical: Diversified, C7 , Prone  Thoracic: Diversified, T1, T6, T9, Prone    Modalities:  None performed this visit    Therapeutic procedures:  Therapeutic stretch to L R/C 3 minutes     Response to Treatment  Reduction in symptoms as reported by patient    Prognosis: Good      Treatment plan and goals:  Goals:  SLEEPING: the patient specific goal is to attain his pre-injury status of 7  hours comfortably  Reaching behind the back    Frequency of care  Duration of care is estimated to be 3-6 weeks, from the initial treatment.  It is estimated that the patient will need a total of 3-6 visits to resolve this episode.  For the initial therapeutic trial of care, the frequency is recommended at  1 X week, once daily.  A reevaluation would be clinically appropriate in 3-6 visits, to determine progress and further course of care.    In-Office Treatment  Evaluation  Spinal Chiropractic Manipulative Therapy        Recommendations:    Instructions:  none     Follow-up:  Return to care in 1 week with US and stretching.     Disclaimer: This note consists of symbols derived from keyboarding, dictation and/or voice recognition software. As a result, there may be errors in the script that have gone undetected. Please consider this when interpreting information found in this chart.

## 2019-04-11 ENCOUNTER — THERAPY VISIT (OUTPATIENT)
Dept: CHIROPRACTIC MEDICINE | Facility: CLINIC | Age: 34
End: 2019-04-11
Payer: COMMERCIAL

## 2019-04-11 DIAGNOSIS — M99.01 CERVICAL SEGMENT DYSFUNCTION: Primary | ICD-10-CM

## 2019-04-11 DIAGNOSIS — R20.2 PARESTHESIAS: ICD-10-CM

## 2019-04-11 DIAGNOSIS — M25.512 CHRONIC LEFT SHOULDER PAIN: ICD-10-CM

## 2019-04-11 DIAGNOSIS — G89.29 CHRONIC LEFT SHOULDER PAIN: ICD-10-CM

## 2019-04-11 DIAGNOSIS — M54.12 BRACHIAL NEURITIS OR RADICULITIS: ICD-10-CM

## 2019-04-11 DIAGNOSIS — M99.02 THORACIC SEGMENT DYSFUNCTION: ICD-10-CM

## 2019-04-11 PROCEDURE — 98940 CHIROPRACT MANJ 1-2 REGIONS: CPT | Mod: AT | Performed by: CHIROPRACTOR

## 2019-04-11 PROCEDURE — 97110 THERAPEUTIC EXERCISES: CPT | Performed by: CHIROPRACTOR

## 2019-04-11 PROCEDURE — 97035 APP MDLTY 1+ULTRASOUND EA 15: CPT | Performed by: CHIROPRACTOR

## 2019-04-11 NOTE — PROGRESS NOTES
Visit #:  2    Subjective:  Cuong Reyes is a 33 year old male who is seen in f/u up for:        Cervical segment dysfunction  Chronic left shoulder pain  Thoracic segment dysfunction  Brachial neuritis or radiculitis  Paresthesias.     Since last visit on 4/2/2019,  Cuong Reyes reports:    Area of chief complaint:  Thoracic and Extra-spinal :  Symptoms are graded at 6/10. The quality is described as stiff, achey, dull.  Motion has increased, but is still not normal. The pt reports soreness post treatment. He states he was slightly better overall however since performing repetitious activities, he notes pain in the L anterior shoulder. The pt denies weakness or other unusual sx. Patient feels that they are improved due to a reduction in symptoms.     Since last visit the patient feels that they are 0-10 percent  improved from last visit.       Objective:  The following was observed:    P: palpatory tenderness Levator scapulae and Sub-occipital L>>R  A: static palpation demonstrates intersegmental asymmetry   R: motion palpation notes restricted motion  T: hypertonicity at: Levator scapulae and T-spine paraspinal Bilaterally    Segmental spinal dysfunction/restrictions found at:  T4  and T9       Assessment:    Diagnoses:      1. Cervical segment dysfunction    2. Chronic left shoulder pain    3. Thoracic segment dysfunction    4. Brachial neuritis or radiculitis    5. Paresthesias        Patient's condition:  Patient had restrictions pre-manipulation    Treatment effectiveness:  Post manipulation there is better intersegmental movement and Patient claims to feel looser post manipulation      Procedures:  CMT:  66187 Chiropractic manipulative treatment 1-2 regions performed   Cervical: Diversified and Activator, C2, C7 , Prone  Thoracic: Diversified, T1, T5, T9, Prone    Modalities:  36485: US:  1.6 Pruitt/cm squared for 8  minutes at 1 mhz  Location: L anterior shoulder @ 1 MHZ     Therapeutic  procedures:  Gave latissimus stretch overhead and pulling the elbow behind the head with demonstration as per stretch protocol.   Gave doorway pectoralis stretch in 3 different positions starting at 90 degree angles from body and raising arms 3 levels higher with demonstration.    Gave biceps stretch with demonstration   Per 8 minutes     Response to Treatment  Reduction in symptoms as reported by patient    Prognosis: Good    Progress towards Goals: Patient is making progress towards the goal.     Recommendations: ACP NV    Follow-up: 1 week with ACP

## 2019-04-23 ENCOUNTER — THERAPY VISIT (OUTPATIENT)
Dept: CHIROPRACTIC MEDICINE | Facility: CLINIC | Age: 34
End: 2019-04-23
Payer: COMMERCIAL

## 2019-04-23 DIAGNOSIS — M99.01 CERVICAL SEGMENT DYSFUNCTION: Primary | ICD-10-CM

## 2019-04-23 DIAGNOSIS — M25.512 CHRONIC LEFT SHOULDER PAIN: ICD-10-CM

## 2019-04-23 DIAGNOSIS — M54.12 BRACHIAL NEURITIS OR RADICULITIS: ICD-10-CM

## 2019-04-23 DIAGNOSIS — R20.2 PARESTHESIAS: ICD-10-CM

## 2019-04-23 DIAGNOSIS — M99.02 THORACIC SEGMENT DYSFUNCTION: ICD-10-CM

## 2019-04-23 DIAGNOSIS — G89.29 CHRONIC LEFT SHOULDER PAIN: ICD-10-CM

## 2019-04-23 PROCEDURE — 98940 CHIROPRACT MANJ 1-2 REGIONS: CPT | Mod: AT | Performed by: CHIROPRACTOR

## 2019-04-23 PROCEDURE — 97810 ACUP 1/> WO ESTIM 1ST 15 MIN: CPT | Mod: GA | Performed by: CHIROPRACTOR

## 2019-04-23 NOTE — PROGRESS NOTES
Visit #:  3    Subjective:  Cuong Reyes is a 33 year old male who is seen in f/u up for:        Cervical segment dysfunction  Chronic left shoulder pain  Thoracic segment dysfunction  Brachial neuritis or radiculitis  Paresthesias.     Since last visit,  Cuong Reyes reports:    Area of chief complaint:  Thoracic and Extra-spinal :  Symptoms are graded at 5/10. The quality is described as stiff, achey, dull.  Motion has increased, but is still not normal. The pt reports at least 20% improvement in the L arm. He is performing daily stretching with good results. He will stretch after performing repetitious activities. The pt denies weakness in the L arm. He denies other unusual sx.     Since last visit the patient feels that they are 20 percent  improved from last visit.       Objective:  The following was observed:    P: palpatory tenderness Levator scapulae and Sub-occipital L>>R  A: static palpation demonstrates intersegmental asymmetry   R: motion palpation notes restricted motion  T: hypertonicity at: Levator scapulae and T-spine paraspinal Bilaterally    Segmental spinal dysfunction/restrictions found at:  T4  and T9       Assessment:    Diagnoses:      1. Cervical segment dysfunction    2. Chronic left shoulder pain    3. Thoracic segment dysfunction    4. Brachial neuritis or radiculitis    5. Paresthesias        Patient's condition:  Patient had restrictions pre-manipulation    Treatment effectiveness:  Post manipulation there is better intersegmental movement and Patient claims to feel looser post manipulation      Procedures:  CMT:  29313 Chiropractic manipulative treatment 1-2 regions performed   Cervical: Diversified and Activator, C2, C7 , Prone  Thoracic: Diversified, T1, T5, T9, Prone    Modalities:  ACP:Dai points in the L anterior shoulder and arm 20 minutes supine    Therapeutic procedures:  Review of stretching     Response to Treatment  Reduction in symptoms as reported by  patient    Prognosis: Good    Progress towards Goals: Patient is making progress towards the goal.     Recommendations: ACP NV    Follow-up: 1 week with ACP

## 2019-05-02 ENCOUNTER — THERAPY VISIT (OUTPATIENT)
Dept: CHIROPRACTIC MEDICINE | Facility: CLINIC | Age: 34
End: 2019-05-02
Payer: COMMERCIAL

## 2019-05-02 DIAGNOSIS — M99.02 THORACIC SEGMENT DYSFUNCTION: ICD-10-CM

## 2019-05-02 DIAGNOSIS — G89.29 CHRONIC LEFT SHOULDER PAIN: ICD-10-CM

## 2019-05-02 DIAGNOSIS — R20.2 PARESTHESIAS: ICD-10-CM

## 2019-05-02 DIAGNOSIS — M25.512 CHRONIC LEFT SHOULDER PAIN: ICD-10-CM

## 2019-05-02 DIAGNOSIS — M99.01 CERVICAL SEGMENT DYSFUNCTION: Primary | ICD-10-CM

## 2019-05-02 DIAGNOSIS — M54.12 BRACHIAL NEURITIS OR RADICULITIS: ICD-10-CM

## 2019-05-02 PROCEDURE — 97810 ACUP 1/> WO ESTIM 1ST 15 MIN: CPT | Mod: GA | Performed by: CHIROPRACTOR

## 2019-05-02 PROCEDURE — 98940 CHIROPRACT MANJ 1-2 REGIONS: CPT | Mod: AT | Performed by: CHIROPRACTOR

## 2019-05-02 NOTE — PROGRESS NOTES
Visit #:  4    Subjective:  Cuong Reyes is a 33 year old male who is seen in f/u up for:        Cervical segment dysfunction  Chronic left shoulder pain  Thoracic segment dysfunction  Brachial neuritis or radiculitis  Paresthesias.     Since last visit,  Cuong Reyes reports:    Area of chief complaint:  Thoracic and Extra-spinal :  Symptoms are graded at 3/10. The quality is described as stiff, achey, dull.  Motion has increased, but is still not normal. The pt reports at least 50% improvement since initial presentation. He is pleased with her progress. He notes L shoulder joint pain today. Radiation is intermittent. The pt denies weakness or other unusual sx.     Since last visit the patient feels that they are 50 percent  improved from last visit.       Objective:  The following was observed:    P: palpatory tenderness Levator scapulae and Sub-occipital L>>R  A: static palpation demonstrates intersegmental asymmetry   R: motion palpation notes restricted motion  T: hypertonicity at: Levator scapulae and T-spine paraspinal Bilaterally    Segmental spinal dysfunction/restrictions found at:  T4  and T9       Assessment:    Diagnoses:      1. Cervical segment dysfunction    2. Chronic left shoulder pain    3. Thoracic segment dysfunction    4. Brachial neuritis or radiculitis    5. Paresthesias        Patient's condition:  Patient had restrictions pre-manipulation    Treatment effectiveness:  Post manipulation there is better intersegmental movement and Patient claims to feel looser post manipulation      Procedures:  CMT:  77787 Chiropractic manipulative treatment 1-2 regions performed   Cervical: Diversified  C7 , Prone  Thoracic: Diversified, T1, T5, T9, Prone    Modalities:  ACP:Dai points in the L anterior and posterior  shoulder and arm 20 minutes   Pt is prone    Therapeutic procedures:  Review of stretching     Response to Treatment  Reduction in symptoms as reported by  patient    Prognosis: Good    Progress towards Goals: Patient is making progress towards the goal.     Recommendations: none     Follow-up: 1 week with ACP

## 2019-05-23 ENCOUNTER — THERAPY VISIT (OUTPATIENT)
Dept: CHIROPRACTIC MEDICINE | Facility: CLINIC | Age: 34
End: 2019-05-23
Payer: COMMERCIAL

## 2019-05-23 DIAGNOSIS — G89.29 CHRONIC LEFT SHOULDER PAIN: ICD-10-CM

## 2019-05-23 DIAGNOSIS — M99.02 THORACIC SEGMENT DYSFUNCTION: ICD-10-CM

## 2019-05-23 DIAGNOSIS — M25.512 CHRONIC LEFT SHOULDER PAIN: ICD-10-CM

## 2019-05-23 DIAGNOSIS — M99.01 CERVICAL SEGMENT DYSFUNCTION: Primary | ICD-10-CM

## 2019-05-23 DIAGNOSIS — R20.2 PARESTHESIAS: ICD-10-CM

## 2019-05-23 DIAGNOSIS — M54.12 BRACHIAL NEURITIS OR RADICULITIS: ICD-10-CM

## 2019-05-23 PROCEDURE — 98940 CHIROPRACT MANJ 1-2 REGIONS: CPT | Mod: AT | Performed by: CHIROPRACTOR

## 2019-05-23 PROCEDURE — 97810 ACUP 1/> WO ESTIM 1ST 15 MIN: CPT | Mod: GA | Performed by: CHIROPRACTOR

## 2019-05-23 NOTE — PROGRESS NOTES
Visit #:  4    Subjective:  Cuong Reyes is a 33 year old male who is seen in f/u up for:        Cervical segment dysfunction  Chronic left shoulder pain  Thoracic segment dysfunction  Brachial neuritis or radiculitis  Paresthesias.     Since last visit,  Cuong Reyes reports:    Area of chief complaint:  Thoracic and Extra-spinal :  Symptoms are graded at 2-5/10. The quality is described as stiff, achey, dull.  Motion has increased, but is still not normal. The pt reports at least 75% improvement overall and did not need to take medication for pain relief since the last treatment. At times he will feel and ache however the pain does not last. He is pleased with his progress and denies weakness or other unusual sx.     Since last visit the patient feels that they are 75 percent  improved from last visit.       Objective:  The following was observed:    P: palpatory tenderness Levator scapulae and Sub-occipital L>>R  A: static palpation demonstrates intersegmental asymmetry   R: motion palpation notes restricted motion  T: hypertonicity at: Levator scapulae and T-spine paraspinal Bilaterally    Segmental spinal dysfunction/restrictions found at:  T4  and T9       Assessment:    Diagnoses:      1. Cervical segment dysfunction    2. Chronic left shoulder pain    3. Thoracic segment dysfunction    4. Brachial neuritis or radiculitis    5. Paresthesias        Patient's condition:  Patient responding well to therapy    Treatment effectiveness:  Post manipulation there is better intersegmental movement and Patient claims to feel looser post manipulation      Procedures:  CMT:  15456 Chiropractic manipulative treatment 1-2 regions performed   Cervical: Diversified  C7 , Prone  Thoracic: Diversified, T1, T5, T9, Prone    Modalities:  ACP:Dai points in the L anterior and posterior  shoulder and arm 20 minutes   Pt is prone    Therapeutic procedures:  Review of stretching     Response to  Treatment  Reduction in symptoms as reported by patient    Prognosis: Good    Progress towards Goals: Patient is making progress towards the goal.     Recommendations: none     Follow-up: 1 week with ACP

## 2019-05-30 ENCOUNTER — THERAPY VISIT (OUTPATIENT)
Dept: CHIROPRACTIC MEDICINE | Facility: CLINIC | Age: 34
End: 2019-05-30
Payer: COMMERCIAL

## 2019-05-30 DIAGNOSIS — G89.29 CHRONIC LEFT SHOULDER PAIN: ICD-10-CM

## 2019-05-30 DIAGNOSIS — R20.2 PARESTHESIAS: ICD-10-CM

## 2019-05-30 DIAGNOSIS — M99.01 CERVICAL SEGMENT DYSFUNCTION: Primary | ICD-10-CM

## 2019-05-30 DIAGNOSIS — M25.512 CHRONIC LEFT SHOULDER PAIN: ICD-10-CM

## 2019-05-30 DIAGNOSIS — M99.02 THORACIC SEGMENT DYSFUNCTION: ICD-10-CM

## 2019-05-30 DIAGNOSIS — M54.12 BRACHIAL NEURITIS OR RADICULITIS: ICD-10-CM

## 2019-05-30 PROCEDURE — 98940 CHIROPRACT MANJ 1-2 REGIONS: CPT | Mod: AT | Performed by: CHIROPRACTOR

## 2019-05-30 PROCEDURE — 97810 ACUP 1/> WO ESTIM 1ST 15 MIN: CPT | Mod: GA | Performed by: CHIROPRACTOR

## 2019-05-30 NOTE — PROGRESS NOTES
Visit #:  6    Subjective:  Cuong Reyes is a 33 year old male who is seen in f/u up for:        Cervical segment dysfunction  Chronic left shoulder pain  Thoracic segment dysfunction  Brachial neuritis or radiculitis  Paresthesias.     Since last visit,  Cuong Reyes reports:    Area of chief complaint:  Thoracic and Extra-spinal :  Symptoms are graded at 2-5/10. The quality is described as stiff, achey, dull.  Motion has increased, but is still not normal. The pt reports at least 75% improvement overall. He has not changed since the previous treatment as he started a new job that required him to lift and perform repetitious activities. He states the L upper shoulder is slightly sore today however not severe. He denies weakness in the extremities or other unusual sx.     Since last visit the patient feels that they are 75 percent  improved from last visit.       Objective:  The following was observed:    P: palpatory tenderness Levator scapulae and Sub-occipital L>>R  A: static palpation demonstrates intersegmental asymmetry   R: motion palpation notes restricted motion  T: hypertonicity at: Levator scapulae and T-spine paraspinal Bilaterally    Segmental spinal dysfunction/restrictions found at:  T4  and T9       Assessment:    Diagnoses:      1. Cervical segment dysfunction    2. Chronic left shoulder pain    3. Thoracic segment dysfunction    4. Brachial neuritis or radiculitis    5. Paresthesias        Patient's condition:  No change    Treatment effectiveness:  Post manipulation there is better intersegmental movement and Patient claims to feel looser post manipulation      Procedures:  CMT:  58324 Chiropractic manipulative treatment 1-2 regions performed   Cervical: Diversified  C7 , Prone  Thoracic: Diversified, T1, T5, T9, Prone    Modalities:  ACP:Dai points in the L anterior and posterior  shoulder and arm 20 minutes   Pt is prone    Therapeutic procedures:  Review of stretching      Response to Treatment  Reduction in symptoms as reported by patient    Prognosis: Good    Progress towards Goals: Patient is making progress towards the goal.     Recommendations: none     Follow-up: 1 week with ACP

## 2019-06-06 ENCOUNTER — THERAPY VISIT (OUTPATIENT)
Dept: CHIROPRACTIC MEDICINE | Facility: CLINIC | Age: 34
End: 2019-06-06
Payer: COMMERCIAL

## 2019-06-06 DIAGNOSIS — M99.02 THORACIC SEGMENT DYSFUNCTION: ICD-10-CM

## 2019-06-06 DIAGNOSIS — M54.12 BRACHIAL NEURITIS OR RADICULITIS: ICD-10-CM

## 2019-06-06 DIAGNOSIS — M99.01 CERVICAL SEGMENT DYSFUNCTION: Primary | ICD-10-CM

## 2019-06-06 DIAGNOSIS — R20.2 PARESTHESIAS: ICD-10-CM

## 2019-06-06 DIAGNOSIS — G89.29 CHRONIC LEFT SHOULDER PAIN: ICD-10-CM

## 2019-06-06 DIAGNOSIS — M25.512 CHRONIC LEFT SHOULDER PAIN: ICD-10-CM

## 2019-06-06 PROCEDURE — 98940 CHIROPRACT MANJ 1-2 REGIONS: CPT | Mod: AT | Performed by: CHIROPRACTOR

## 2019-06-06 PROCEDURE — 97810 ACUP 1/> WO ESTIM 1ST 15 MIN: CPT | Mod: GA | Performed by: CHIROPRACTOR

## 2019-06-06 NOTE — PROGRESS NOTES
Visit #:  7    Subjective:  Cuong Reyes is a 33 year old male who is seen in f/u up for:        Cervical segment dysfunction  Chronic left shoulder pain  Thoracic segment dysfunction  Brachial neuritis or radiculitis  Paresthesias.     Since last visit,  Cuong Reyes reports:    Area of chief complaint:  Thoracic and Extra-spinal :  Symptoms are graded at 3/10. The quality is described as stiff, achey, dull.  Motion has increased, but is still not normal. The pt reports 80% in the L shoulder and is doing well. He has been lifting for prolonged periods and reports more stability in the L shoulder than the previous treatment. The pt denies radiation of pain in the L arm. Most of the ache is located in the L shoulder joint. He denies weakness or other unusual sx.     Since last visit the patient feels that they are 80 percent  improved from last visit.       Objective:  The following was observed:    P: palpatory tenderness Levator scapulae and Sub-occipital L>>R  A: static palpation demonstrates intersegmental asymmetry   R: motion palpation notes restricted motion  T: hypertonicity at: Levator scapulae and T-spine paraspinal Bilaterally    Segmental spinal dysfunction/restrictions found at:  T4  and T9       Assessment:    Diagnoses:      1. Cervical segment dysfunction    2. Chronic left shoulder pain    3. Thoracic segment dysfunction    4. Brachial neuritis or radiculitis    5. Paresthesias        Patient's condition:  Pt responding well to therapy    Treatment effectiveness:  Post manipulation there is better intersegmental movement and Patient claims to feel looser post manipulation      Procedures:  CMT:  90452 Chiropractic manipulative treatment 1-2 regions performed   Cervical: Diversified  C7 , Prone  Thoracic: Diversified, T1, T5, T9, Prone    Modalities:  ACP:Dai points in the L anterior and posterior  shoulder and arm 20 minutes   Pt is prone    Therapeutic procedures:  Review of  stretching     Response to Treatment  Reduction in symptoms as reported by patient    Prognosis: Good    Progress towards Goals: Patient is making progress towards the goal.     Recommendations: none     Follow-up: 1 week with ACP

## 2019-06-07 ENCOUNTER — OFFICE VISIT (OUTPATIENT)
Dept: PALLIATIVE MEDICINE | Facility: CLINIC | Age: 34
End: 2019-06-07
Payer: COMMERCIAL

## 2019-06-07 VITALS — SYSTOLIC BLOOD PRESSURE: 113 MMHG | OXYGEN SATURATION: 97 % | HEART RATE: 78 BPM | DIASTOLIC BLOOD PRESSURE: 76 MMHG

## 2019-06-07 DIAGNOSIS — M25.512 CHRONIC LEFT SHOULDER PAIN: Primary | ICD-10-CM

## 2019-06-07 DIAGNOSIS — G89.29 CHRONIC LEFT SHOULDER PAIN: Primary | ICD-10-CM

## 2019-06-07 PROCEDURE — 99214 OFFICE O/P EST MOD 30 MIN: CPT | Performed by: PHYSICAL MEDICINE & REHABILITATION

## 2019-06-07 ASSESSMENT — PAIN SCALES - GENERAL: PAINLEVEL: NO PAIN (1)

## 2019-06-07 NOTE — PROGRESS NOTES
[facility]    Date of visit: 6/7/2019    Chief complaint:   Chief Complaint   Patient presents with     Pain     Interval history:  Cuong Reyes is a 33 year old male last seen by me on 3/13/2019.      Recommendations/plan at the last visit included:  1. Therapy: Not at this time. He has had multiple sessions of PT in the past. He has good shoulder mobility.   2. Clinical Health Pain Psychologist: Not at this time. Can consider if he is not coping well with pain. At this time he seems to be coping ok.   3. Diagnostic Studies: No imaging needed  4. Medication Management:   1. Gabapentin has not been helpful so will discontinue. Taper instructions written for patient in AVS.   2. Continue lidocaine 4% patches.   3. Ok to continue using oxycodone sparingly when pain is severe.   4. He has ongoing pain despite using NSAIDs, topical medication, tylenol, trial of neuropathic medication, PT without benefit. He does quality as having intractable pain. Certification completed for medical cannabis.   5. Further procedures recommended: None  6. Other:   1. Order placed to start acupuncture  2. Discussed that he should follow up with Orthopedics if he has been noticing more popping sensations in the shoulder.  7. Follow up: 12 weeks in clinic    Since his last visit, Cuong Reyes reports:  - Pain is significantly better than it was at the last visit. He started acupuncture and feels that it has significantly improved his shoulder pain. He completed his last session of acupuncture yesterday.  - He tried medical cannabis for 1-2 weeks but has not used it quite a while because he has not needed it.   - Last used oxycodone about 1.5 months ago.   - He is continuing to work and it is going well.     Pain Information:   Pain quality: Aching    Pain timing: Constant     Pain rating: intensity ranges from 0.5/10 to 5.5/10, and averages 1.5/10 on a 0-10 scale. Currently, 1/10.   Aggravating  factors include: overhead movements, laying on the left side   Relieving factors include: laying on the right side and resting the arm    Current pain treatments:     Current pain medications:              [id number] mg BID - takes edge off              Oxycodone 5 mg - last used one 1.5 month ago   Lidocaine 4% patch - [facility] (for superficial pain, not for the deep pain)     Current MME: ? He is using very sparingly.    Review of [place] Prescription Monitoring Program (): No concern for abuse or misuse of controlled medications based on this report.     Annual Controlled Substance Agreement/UDS due date: NA    Past pain treatments:  1. Previous Pain Relevant Medications:  NOTE: This medication information taken from patient's intake form, not medical records.               Opiates: hydrocodone, oxycodone - [facility], tramadol - [facility]              NSAIDS: ibuprofen - [facility], naproxen - [facility]              Muscle Relaxants: Flexeril - ?              Anti-migraine mediations: None              Anti-depressants: None              Sleep aids:None              Anxiolytics: None              Neuropathics: Gabapentin - [facility]                  Topicals: Voltaren 1% gel              Other medications not covered above: none      2. Physical Therapy: yes, multiple times - [facility]   3. Pain Psychology: No  4. Surgery: left shoulder surgery and tendon transfer   5. Injections: Steroid injections into left shoulder - only helpful while lidocaine was working  6. Chiropractic: No  7. Acupuncture: Yes - very helpful  8. TENS Unit: Yes and [facility]  9. Other: None    Medications:  Current Outpatient Medications   Medication Sig Dispense Refill     diclofenac (VOLTAREN) 1 % topical gel Place onto the skin 4 times daily 100 g 1     fluticasone (FLONASE) 50 MCG/ACT nasal spray Spray 1 spray into both nostrils daily (Patient not taking: Reported on 1/11/2019) 1 Package 11     gabapentin (NEURONTIN) 300 MG capsule Take 2 capsules (600 mg) by mouth 3 times daily 180  capsule 1     oxyCODONE (ROXICODONE) 5 MG immediate release tablet Take 1-2 tablets (5-10 mg) by mouth every 4 hours as needed for moderate to severe pain 30 tablet 0     sulindac (CLINORIL) 150 MG tablet Take 1 tablet (150 mg) by mouth 2 times daily 60 tablet 3       Medical History: any changes in medical history since they were last seen? No    Review of Systems:  The 14 system ROS was reviewed from the intake questionnaire, and is positive for: joint pain.  Any bowel or bladder problems: none  Mood: ok    Physical Exam:  Blood pressure 113/76, pulse 78, SpO2 97 %.  General: A&O x3, in NAD. Pleasant and cooperative.   Gait: Normal  Neuro: 5/5 in BUE with exception of 4/5 left finger abduction and flexion.     Imaging:  No imaging available    Assessment:   Cuong Reyes is a 33 year old male with a past medical history significant for left shoulder dislocation s/p left shoulder surgery with resulting ulnar nerve injury requiring repair who presents with complaints of chronic left shoulder and arm/hand pain.     1. Left shoulder pain: Ongoing s/p surgery in 2011. Has had significant improvement with acupuncture and medical cannabis.   2. Neuropathic pain: in left ulnar distribution.     Plan:     1. Therapy: Not at this time. He has had multiple sessions of PT in the past. He has good shoulder mobility.   2. Clinical Health Pain Psychologist: Not at this time. Can consider if he is not coping well with pain. At this time he seems to be coping ok.   3. Diagnostic Studies: No imaging needed  4. Medication Management:   1. Continue medical cannabis prn.  2. Continue lidocaine 4% patches prn  3. Ok to continue using oxycodone sparingly when pain is severe.   5. Further procedures recommended: None  6. Other: Discussed that he should follow up with Orthopedics if he has been noticing more popping sensations in the shoulder.  7. Follow up: Will need to see him annually if he is interested in re-certification  for medical cannabis.     I spent 30 minutes of time face to face with the patient.  Greater than 50% of this time was spent in patient counseling and/or coordination of care.    Samina Conley MD  [facility] [place]

## 2019-06-07 NOTE — PATIENT INSTRUCTIONS
1. If you are interested in re-certification for medical cannabis I will need to see you back in March 2020.   2. You can follow up in clinic with me as needed.    ----------------------------------------------------------------  Clinic Number:  [phone]   Call this number with any questions about your care and for scheduling assistance. Calls are returned Monday through Friday between 8 AM and 4:30 PM. We usually get back to you within 2 business days depending on the issue/request.       Medication refills:    For non-opioid medications, call your pharmacy directly to request a refill. The pharmacy will contact the [facility] for authorization. Please allow 3-4 days for these refills to be processed.     For opioid refills, call the clinic number or send a [employer] message. Please contact us 7-10 days before your refill is due. The message MUST include the name of the specific medication(s) requested and how you would like to receive the prescription(s). The options are as follows:    Pain Clinic staff can mail the prescription to your pharmacy. Please tell us the name of the pharmacy.    You may pick the prescription up at the Pain Clinic (tell us the location) or during a clinic visit with your pain provider    Pain Clinic staff can deliver the prescription to the [facility] pharmacy in the clinic building. Please tell us the location.      We believe regular attendance is key to your success in our program.    Any time you are unable to keep your appointment we ask that you call us at least 24 hours in advance to let us know. This will allow us to offer the appointment time to another patient.

## 2019-08-28 ENCOUNTER — THERAPY VISIT (OUTPATIENT)
Dept: CHIROPRACTIC MEDICINE | Facility: CLINIC | Age: 34
End: 2019-08-28
Payer: COMMERCIAL

## 2019-08-28 DIAGNOSIS — M99.01 CERVICAL SEGMENT DYSFUNCTION: ICD-10-CM

## 2019-08-28 DIAGNOSIS — M54.6 ACUTE BILATERAL THORACIC BACK PAIN: ICD-10-CM

## 2019-08-28 DIAGNOSIS — M99.02 THORACIC SEGMENT DYSFUNCTION: Primary | ICD-10-CM

## 2019-08-28 PROCEDURE — 97035 APP MDLTY 1+ULTRASOUND EA 15: CPT | Performed by: CHIROPRACTOR

## 2019-08-28 PROCEDURE — 98940 CHIROPRACT MANJ 1-2 REGIONS: CPT | Mod: AT | Performed by: CHIROPRACTOR

## 2019-08-28 PROCEDURE — 99212 OFFICE O/P EST SF 10 MIN: CPT | Mod: 25 | Performed by: CHIROPRACTOR

## 2019-09-02 PROBLEM — M54.6 ACUTE BILATERAL THORACIC BACK PAIN: Status: ACTIVE | Noted: 2019-09-02

## 2019-09-02 NOTE — PROGRESS NOTES
Chiropractic Clinic Visit    PCP: No Ref-Primary, Physician    Cuong Reyes is a 34 year old male who is seen  in consultation at the request of  A self referral. The pt reports upper mid back to lower cervical  pain that started one month ago. He cannot relate a specific incident that could have caused the px. He states it may have been his sleeping positions. He states several days prior he was lifting heavy objects and that may have contributed. The pt denies weakness in the extremities, arm pain or other unusual sx.   Prior to onset, the patient was able to sleep uninterrupted. . Patient notes that due to symptoms, they can only sleep with interruption due to pain. Cuong Reyes notes   sleeping rated at a 3/10 difficulty  and prior to this incident it was 1/10.    Injury: There was no acute injury related to this episode    Location of Pain: left shoulder at the following level(s) C7 , T1 , T5  and T9   Duration of Pain: 1 month   Rating of Pain at worst: 3/10  Rating of Pain Currently: 3/10  Symptoms are better with: Nothing  Symptoms are worse with: sleeping, reaching   Additional Features: none        Health History  as reported by the patient:    How does the patient rate their own health:   Good    Current or past medical history:   Smoking, pain at rest at night    Medical allergies  None    Past Traumas/Surgeries  Orthopedic: L shoulder and L hand surgery    Family History  Family History   Problem Relation Age of Onset     Diabetes Mother      Diabetes Father        Medications:  Anti-inflammatory    Occupation:        Primary job tasks:   Lifting/carrying, Prolonged standing and Repetitive tasks    Barriers as home/work:   none    Additional health Issues:     None       Review of Systems  Musculoskeletal: as above  Remainder of review of systems is negative including constitutional, CV, pulmonary, GI, Skin and Neurologic except as noted in HPI or medical  history.    Past Medical History:   Diagnosis Date     Other specified viral exanthemata      Past Surgical History:   Procedure Laterality Date     ORTHOPEDIC SURGERY      biceps tenodesis surgery     ORTHOPEDIC SURGERY      tendon transfer for ulnar nerve palsy       Objective  There were no vitals taken for this visit.    GENERAL APPEARANCE: healthy, alert and no distress   GAIT: NORMAL  SKIN: no suspicious lesions or rashes  NEURO: Normal strength and tone, mentation intact and speech normal  PSYCH:  mentation appears normal and affect normal/bright    Cuong was asked to complete the  pain severity scale: 3/10.  KATIA: 6  Saima: 3  Subscore: 0         Cervical Spine Exam    Range of Motion:         Full active and passive ROM forward flexion, extension,  decreased L  lateral rotation, lateral flexion.    Inspection:         No visible deformity        normal lordotic curvature maintained    Tender:        upper border of trapezius    B Rhomboid     Non-Tender:        remainder of cervical spine area    Muscle strength:       C4 (shoulder shrug)  symmetric 5/5 Normal       C5 (shoulder abduction) symmetric 5/5 Normal       C6 (elbow flexion) symmetric 5/5 Normal       C7 (elbow extension) symmetric 5/5 Normal       C8 (finger abduction, thumb flexion) symmetric 5/5 Normal    Reflexes:        C5 (biceps) symmetric 2 bilaterally       C6 (supinator) symmetric 2 bilaterally       C7 (triceps) symmetric 2 bilaterally    Sensation:       grossly intact througout bilateral upper extremities    Special Tests:       positive (+) Spurling  Ashutosh's- positive, VBI- negative and Dominguez Benitez - negative    Lymphatics:        no edema noted in the upper extremities       Segmental spinal dysfunction/restrictions found at:  C7 , T1  and T5. T9      The following soft tissue hypotonicities were observed:B Rhomboid : ache and dull pain, referred pain: no    Trigger points were found in:none     Muscle spasm found in: B Rhomboid          Radiology:  None     Assessment:    1. Thoracic segment dysfunction    2. Acute bilateral thoracic back pain    3. Cervical segment dysfunction        RX ordered/plan of care  Anticipated outcomes  Possible risks and side effects    After discussing the risk and benefits of care, patient consented to treatment    Patient's condition:  Patient had restrictions pre-manipulation    Treatment effectiveness:  Post manipulation there is better intersegmental movement and Patient claims to feel looser post manipulation    Plan:    Procedures:    Evaluation and Management:  01380 low  Moderate level exam 30 min    CMT:  18419 Chiropractic manipulative treatment 1-2 regions performed   Cervical: Diversified, C7 , Prone  Thoracic: Diversified, T1, T6, T9, Prone    Modalities:  US Therapy: 1.7 cook/cm2 to the thoracic paraspinals @ 8 minutes 1 MHZ    Therapeutic procedures:  Therapeutic stretch to L R/C 3 minutes     Response to Treatment  Reduction in symptoms as reported by patient    Prognosis: Good      Treatment plan and goals:  Goals:  SLEEPING: the patient specific goal is to attain his pre-injury status of 7  hours comfortably  Reaching behind the back    Frequency of care  Duration of care is estimated to be 3-6 weeks, from the initial treatment.  It is estimated that the patient will need a total of 3-6 visits to resolve this episode.  For the initial therapeutic trial of care, the frequency is recommended at 1 X week, once daily.  A reevaluation would be clinically appropriate in 3-6 visits, to determine progress and further course of care.    In-Office Treatment  Evaluation  Spinal Chiropractic Manipulative Therapy        Recommendations:    Instructions:  none     Follow-up:  Return to care in 1 week with US     Disclaimer: This note consists of symbols derived from keyboarding, dictation and/or voice recognition software. As a result, there may be errors in the script that have gone undetected. Please  consider this when interpreting information found in this chart.

## 2019-10-02 ENCOUNTER — HEALTH MAINTENANCE LETTER (OUTPATIENT)
Age: 34
End: 2019-10-02

## 2020-03-22 ENCOUNTER — HEALTH MAINTENANCE LETTER (OUTPATIENT)
Age: 35
End: 2020-03-22

## 2021-01-15 ENCOUNTER — HEALTH MAINTENANCE LETTER (OUTPATIENT)
Age: 36
End: 2021-01-15

## 2021-05-16 ENCOUNTER — HEALTH MAINTENANCE LETTER (OUTPATIENT)
Age: 36
End: 2021-05-16

## 2021-09-05 ENCOUNTER — HEALTH MAINTENANCE LETTER (OUTPATIENT)
Age: 36
End: 2021-09-05

## 2022-06-11 ENCOUNTER — HEALTH MAINTENANCE LETTER (OUTPATIENT)
Age: 37
End: 2022-06-11

## 2022-10-22 ENCOUNTER — HEALTH MAINTENANCE LETTER (OUTPATIENT)
Age: 37
End: 2022-10-22

## 2023-06-18 ENCOUNTER — HEALTH MAINTENANCE LETTER (OUTPATIENT)
Age: 38
End: 2023-06-18